# Patient Record
Sex: FEMALE | Race: WHITE
[De-identification: names, ages, dates, MRNs, and addresses within clinical notes are randomized per-mention and may not be internally consistent; named-entity substitution may affect disease eponyms.]

---

## 2020-06-12 ENCOUNTER — HOSPITAL ENCOUNTER (OUTPATIENT)
Dept: HOSPITAL 75 - RAD | Age: 75
End: 2020-06-12
Attending: OTOLARYNGOLOGY
Payer: MEDICARE

## 2020-06-12 VITALS — BODY MASS INDEX: 26.29 KG/M2 | WEIGHT: 148.37 LBS | HEIGHT: 62.99 IN

## 2020-06-12 DIAGNOSIS — E04.1: Primary | ICD-10-CM

## 2020-06-12 NOTE — DIAGNOSTIC IMAGING REPORT
INDICATION: Left thyroid mass. Patient presents for

ultrasound-guided biopsy.



Patient was brought to the procedure and placed on table in the

supine position. Ultrasound imaging of the left neck was

performed to evaluate appropriate entry site. Total of 4 passes

were made into the hypoechoic partially calcified mass in the

left lobe of the thyroid utilizing 25-gauge needles and

fine-needle aspiration technique. A single pass was made with the

Rotex needle. Hemostasis was obtained. Patient tolerated the

procedure well and left the department in stable condition.



IMPRESSION: Successful ultrasound-guided fine-needle aspiration

and Rotex biopsy of the left lobe thyroid partially calcified

mass. Pathology results are currently pending.



Dictated by: 



  Dictated on workstation # [id number]